# Patient Record
Sex: MALE | Race: WHITE | NOT HISPANIC OR LATINO | URBAN - METROPOLITAN AREA
[De-identification: names, ages, dates, MRNs, and addresses within clinical notes are randomized per-mention and may not be internally consistent; named-entity substitution may affect disease eponyms.]

---

## 2022-08-16 ENCOUNTER — HOSPITAL ENCOUNTER (EMERGENCY)
Facility: HOSPITAL | Age: 47
Discharge: HOME | End: 2022-08-16
Payer: MEDICAID

## 2022-08-16 VITALS
HEART RATE: 89 BPM | TEMPERATURE: 99 F | HEIGHT: 69 IN | WEIGHT: 200 LBS | RESPIRATION RATE: 14 BRPM | BODY MASS INDEX: 29.62 KG/M2 | OXYGEN SATURATION: 96 % | DIASTOLIC BLOOD PRESSURE: 79 MMHG | SYSTOLIC BLOOD PRESSURE: 119 MMHG

## 2022-08-16 DIAGNOSIS — U07.1 COVID: Primary | ICD-10-CM

## 2022-08-16 LAB
FLUAV AG UPPER RESP QL IA.RAPID: NOT DETECTED
FLUBV AG UPPER RESP QL IA.RAPID: NOT DETECTED
SARS-COV-2 RNA RESP QL NAA+PROBE: DETECTED

## 2022-08-16 PROCEDURE — 96372 THER/PROPH/DIAG INJ SC/IM: CPT | Performed by: NURSE PRACTITIONER

## 2022-08-16 PROCEDURE — 63600175 PHARM REV CODE 636 W HCPCS: Performed by: NURSE PRACTITIONER

## 2022-08-16 PROCEDURE — 87636 SARSCOV2 & INF A&B AMP PRB: CPT | Performed by: NURSE PRACTITIONER

## 2022-08-16 PROCEDURE — 99285 EMERGENCY DEPT VISIT HI MDM: CPT | Mod: 25

## 2022-08-16 RX ORDER — HYDROMORPHONE HYDROCHLORIDE 2 MG/ML
2 INJECTION, SOLUTION INTRAMUSCULAR; INTRAVENOUS; SUBCUTANEOUS
Status: COMPLETED | OUTPATIENT
Start: 2022-08-16 | End: 2022-08-16

## 2022-08-16 RX ORDER — ONDANSETRON 4 MG/1
4 TABLET, FILM COATED ORAL EVERY 6 HOURS
Qty: 12 TABLET | Refills: 0 | Status: SHIPPED | OUTPATIENT
Start: 2022-08-16

## 2022-08-16 RX ADMIN — HYDROMORPHONE HYDROCHLORIDE 2 MG: 2 INJECTION INTRAMUSCULAR; INTRAVENOUS; SUBCUTANEOUS at 06:08

## 2022-08-16 NOTE — ED PROVIDER NOTES
"Encounter Date: 8/16/2022       History     Chief Complaint   Patient presents with    Headache     Pt reports severe headache that causes nausea and intermittent blurry vision without relief since 2230 last night. Denies past hx of migraines. Pt reports congestion, fever, chills, has taken over the counter medication without relief.      47 yo M c/o headache onset 1030 pm last night, "worst headache ever" , not improving with otc meds        Review of patient's allergies indicates:  No Known Allergies  No past medical history on file.  No past surgical history on file.  No family history on file.     Review of Systems   Neurological: Positive for headaches.   All other systems reviewed and are negative.      Physical Exam     Initial Vitals [08/16/22 1804]   BP Pulse Resp Temp SpO2   (!) 145/100 92 20 98.9 °F (37.2 °C) 99 %      MAP       --         Physical Exam    Vitals reviewed.  Constitutional: He appears well-developed and well-nourished.   HENT:   Head: Normocephalic and atraumatic.   Neck: Neck supple.   Normal range of motion.  Cardiovascular: Normal rate, regular rhythm and normal heart sounds.   Pulmonary/Chest: Breath sounds normal.   Musculoskeletal:         General: Normal range of motion.      Cervical back: Normal range of motion and neck supple.     Neurological: He is alert and oriented to person, place, and time.   Skin: Skin is warm and dry. Capillary refill takes less than 2 seconds.   Psychiatric: He has a normal mood and affect.         ED Course   Procedures  Labs Reviewed   COVID/FLU A&B PCR - Abnormal; Notable for the following components:       Result Value    SARS-CoV-2 PCR Detected (*)     All other components within normal limits          Imaging Results          CT Head Without Contrast (Final result)  Result time 08/16/22 19:17:18    Final result by Bin Guerra MD (08/16/22 19:17:18)                 Impression:      1.  No acute intracranial findings identified.    2.  Sinusitis " changes.      Electronically signed by: Bin Guerra  Date:    08/16/2022  Time:    19:17             Narrative:    EXAMINATION:  CT HEAD WITHOUT CONTRAST    CLINICAL HISTORY:  Headache, sudden, severe;    TECHNIQUE:  Sequential axial images were performed of the brain without contrast.    Dose product length of 1105 mGycm. Automated exposure control was utilized to minimize radiation dose.    COMPARISON:  None available.    FINDINGS:  There is no intracranial mass effect, midline shift, hydrocephalus or hemorrhage. There is no sulcal effacement or low attenuation changes to suggest recent large vessel territory infarction.  There is no acute extra axial fluid collection.    There is mucoperiosteal thickening of the ethmoidal air cells and the left maxillary sinus. Mastoid air cells aeration is optimal.                                 Medications   HYDROmorphone (PF) injection 2 mg (2 mg Intramuscular Given 8/16/22 1851)                          Clinical Impression:   Final diagnoses:  [U07.1] COVID (Primary)          ED Disposition Condition    Discharge Stable        ED Prescriptions     Medication Sig Dispense Start Date End Date Auth. Provider    ondansetron (ZOFRAN) 4 MG tablet Take 1 tablet (4 mg total) by mouth every 6 (six) hours. 12 tablet 8/16/2022  ELISEO Nolan        Follow-up Information     Follow up With Specialties Details Why Contact Info    Erendira Phelan, DO Internal Medicine  As needed [address] [place] [address] Dr Boyd [address] [place] [id number]  [phone]             ELISEO Nolan  08/16/22 1957